# Patient Record
Sex: MALE | Race: BLACK OR AFRICAN AMERICAN | Employment: FULL TIME | URBAN - NONMETROPOLITAN AREA
[De-identification: names, ages, dates, MRNs, and addresses within clinical notes are randomized per-mention and may not be internally consistent; named-entity substitution may affect disease eponyms.]

---

## 2024-05-29 ENCOUNTER — HOSPITAL ENCOUNTER (EMERGENCY)
Age: 29
Discharge: HOME | End: 2024-05-29
Payer: COMMERCIAL

## 2024-05-29 VITALS
RESPIRATION RATE: 16 BRPM | HEART RATE: 80 BPM | DIASTOLIC BLOOD PRESSURE: 75 MMHG | OXYGEN SATURATION: 97 % | HEIGHT: 67 IN | WEIGHT: 180 LBS | SYSTOLIC BLOOD PRESSURE: 113 MMHG | TEMPERATURE: 98.8 F | BODY MASS INDEX: 28.25 KG/M2

## 2024-05-29 DIAGNOSIS — R19.7 NAUSEA VOMITING AND DIARRHEA: Primary | ICD-10-CM

## 2024-05-29 DIAGNOSIS — R11.2 NAUSEA VOMITING AND DIARRHEA: Primary | ICD-10-CM

## 2024-05-29 PROCEDURE — 99202 OFFICE O/P NEW SF 15 MIN: CPT | Performed by: NURSE PRACTITIONER

## 2024-05-29 PROCEDURE — 99213 OFFICE O/P EST LOW 20 MIN: CPT

## 2024-05-29 RX ORDER — ONDANSETRON 4 MG/1
4 TABLET, ORALLY DISINTEGRATING ORAL 3 TIMES DAILY PRN
Qty: 21 TABLET | Refills: 0 | Status: SHIPPED | OUTPATIENT
Start: 2024-05-29

## 2024-05-29 ASSESSMENT — ENCOUNTER SYMPTOMS
VOMITING: 1
SORE THROAT: 0
DIARRHEA: 1
NAUSEA: 1
ABDOMINAL PAIN: 0

## 2024-05-29 ASSESSMENT — PAIN - FUNCTIONAL ASSESSMENT: PAIN_FUNCTIONAL_ASSESSMENT: 0-10

## 2024-05-29 ASSESSMENT — PAIN SCALES - GENERAL: PAINLEVEL_OUTOF10: 0

## 2024-05-29 NOTE — DISCHARGE INSTR - COC
Continuity of Care Form    Patient Name: Ashleigh Em   :  1995  MRN:  [id number]    Admit date:  2024  Discharge date:  ***    Code Status Order: No Order   Advance Directives:     Admitting Physician:  No admitting provider for patient encounter.  PCP: No primary care provider on file.    Discharging Nurse: ***  Discharging Hospital Unit/Room#:   Discharging Unit Phone Number: ***    Emergency Contact:   Extended Emergency Contact Information  Primary Emergency Contact: Art Calabrese  Home Phone: [phone]  Relation: Other Relative   needed? No    Past Surgical History:  Past Surgical History:   Procedure Laterality Date    APPENDECTOMY         Immunization History:     There is no immunization history on file for this patient.    Active Problems:  There is no problem list on file for this patient.      Isolation/Infection:   Isolation            No Isolation          Patient Infection Status       None to display            Nurse Assessment:  Last Vital Signs: /75   Pulse 80   Temp 98.8 °F (37.1 °C) (Temporal)   Resp 16   Ht 1.702 m (5' 7\")   Wt 81.6 kg (180 lb)   SpO2 97%   BMI 28.19 kg/m²     Last documented pain score (0-10 scale): Pain Level: 0  Last Weight:   Wt Readings from Last 1 Encounters:   24 81.6 kg (180 lb)     Mental Status:  {IP PT MENTAL STATUS:55334}    IV Access:  { UZIEL IV ACCESS:615293174}    Nursing Mobility/ADLs:  Walking   {CHP DME ADLs:734843734}  Transfer  {CHP DME ADLs:306338928}  Bathing  {CHP DME ADLs:797036111}  Dressing  {CHP DME ADLs:988138196}  Toileting  {CHP DME ADLs:148840686}  Feeding  {CHP DME ADLs:016968765}  Med Admin  {CHP DME ADLs:089787377}  Med Delivery   { UZIEL MED Delivery:222218353}    Wound Care Documentation and Therapy:        Elimination:  Continence:   Bowel: {YES / NO:}  Bladder: {YES / NO:}  Urinary Catheter: {Urinary Catheter:781551761}   Colostomy/Ileostomy/Ileal Conduit: {YES  continuing treatment of the diagnosis listed and that he requires {Admit to Appropriate Level of Care:20643} for {GREATER/LESS:838403611} 30 days.     Update Admission H&P: {CHP DME Changes in HandP:765577739}    PHYSICIAN SIGNATURE:  {Esignature:145622349}

## 2024-05-29 NOTE — ED PROVIDER NOTES
[facility] URGENT CARE  Urgent Care Encounter       CHIEF COMPLAINT       Chief Complaint   Patient presents with    Emesis    Diarrhea     Onset of all symptoms 5/29/24        Nurses Notes reviewed and I agree except as noted in the HPI.  HISTORY OF PRESENT ILLNESS   Ashleigh Em is a 29 y.o. male who presents with new onset nausea, vomiting, and diarrhea that started this morning.  This is a new problem.  Denies any suspicious food intake.  No reported fever.  Has tried to drink fluids without any success and continues to vomit.  No known exposure to illness.  No recent travel.    The history is provided by the patient.       REVIEW OF SYSTEMS     Review of Systems   Constitutional:  Negative for fever.   HENT:  Negative for sore throat.    Gastrointestinal:  Positive for diarrhea, nausea and vomiting. Negative for abdominal pain.   Neurological:  Negative for weakness and headaches.       PAST MEDICAL HISTORY   History reviewed. No pertinent past medical history.    SURGICALHISTORY     Patient  has a past surgical history that includes Appendectomy.    CURRENT MEDICATIONS       Previous Medications    No medications on file       ALLERGIES     Patient is has No Known Allergies.    Patients   There is no immunization history on file for this patient.    FAMILY HISTORY     Patient's family history is not on file.    SOCIAL HISTORY     Patient  reports that he has never smoked. He has never used smokeless tobacco. He reports that he does not drink alcohol and does not use drugs.    PHYSICAL EXAM     ED TRIAGE VITALS  BP: 113/75, Temp: 98.8 °F (37.1 °C), Pulse: 80, Respirations: 16, SpO2: 97 %,Estimated body mass index is 28.19 kg/m² as calculated from the following:    Height as of this encounter: 1.702 m (5' 7\").    Weight as of this encounter: 81.6 kg (180 lb).,No LMP for male patient.    Physical Exam  Vitals and nursing note reviewed.   Constitutional:       General: He is not in

## 2024-05-29 NOTE — ED TRIAGE NOTES
To room via ambulation. Onset of vomiting and diarrhea onset 5/29/24 early this am. Vomiting x 6 in 24 hours and diarrhea x 4 in 24 hours. Pt states he has tried to eat but has vomiting and diarrhea after eating.

## 2025-07-05 ENCOUNTER — TELEMEDICINE ON DEMAND (OUTPATIENT)
Age: 30
End: 2025-07-05

## 2025-07-05 DIAGNOSIS — Z20.2 EXPOSURE TO CHLAMYDIA: Primary | ICD-10-CM

## 2025-07-05 DIAGNOSIS — Z20.2 STD EXPOSURE: ICD-10-CM

## 2025-07-05 RX ORDER — AZITHROMYCIN 500 MG/1
1000 TABLET, FILM COATED ORAL ONCE
Qty: 2 TABLET | Refills: 0 | Status: SHIPPED | OUTPATIENT
Start: 2025-07-05 | End: 2025-07-05

## 2025-07-05 ASSESSMENT — ENCOUNTER SYMPTOMS
RESPIRATORY NEGATIVE: 1
GASTROINTESTINAL NEGATIVE: 1

## 2025-07-05 NOTE — PROGRESS NOTES
[facility] Primary Care Virtualist Encounter Note       Chief Complaint   Patient presents with    Exposure to STD     Exposure to partner who is + and treated for Chlamydia and would like to be treated       HPI:    Ashleigh Valverdentz Shola mE (:  1995) has requested an audio/video evaluation for the following concern(s):    This is a new patient for me. Patient does not have a PCP at this time. This is the first time I am seeing the patient today. He requested this visit for exposure to partner who is + and treated for Chlamydia and would like to be treated. He would also like to do test if possible but understands not required to do test with positive exposure. He is not good with taking medication and request to take the one dose of Azithromycin. No symptoms or other issues at this time.    Review of Systems   Constitutional:  Negative for chills and fever.   HENT: Negative.     Respiratory: Negative.     Cardiovascular: Negative.    Gastrointestinal: Negative.    Genitourinary:  Negative for dysuria, frequency, hematuria and urgency.        Positive exposure to Chlamydia.       Prior to Visit Medications    Medication Sig Taking? Authorizing Provider   azithromycin (ZITHROMAX) 500 MG tablet Take 2 tablets by mouth once for 1 dose Yes Corrie Christina APRN - CNP   ondansetron (ZOFRAN-ODT) 4 MG disintegrating tablet Take 1 tablet by mouth 3 times daily as needed for Nausea or Vomiting  Tonio Mccullough APRN - CNP       Social History     Tobacco Use    Smoking status: Never    Smokeless tobacco: Never   Vaping Use    Vaping status: Never Used   Substance Use Topics    Alcohol use: Never    Drug use: Never        No Known Allergies, History reviewed. No pertinent past medical history.,   Past Surgical History:   Procedure Laterality Date    APPENDECTOMY     ,   Social History     Tobacco Use    Smoking status: Never    Smokeless tobacco: Never   Vaping Use    Vaping status: Never Used

## 2025-07-05 NOTE — PATIENT INSTRUCTIONS
Notify office if you have no improvement or worsening of condition.   Take medication as prescribed.  Follow up if not improving or worsening.  Please review educational handouts.